# Patient Record
Sex: FEMALE
[De-identification: names, ages, dates, MRNs, and addresses within clinical notes are randomized per-mention and may not be internally consistent; named-entity substitution may affect disease eponyms.]

---

## 2023-12-12 ENCOUNTER — P2P PATIENT RECORD (OUTPATIENT)
Age: 73
End: 2023-12-12

## 2023-12-14 ENCOUNTER — TELEPHONE ENCOUNTER (OUTPATIENT)
Dept: URBAN - METROPOLITAN AREA CLINIC 64 | Facility: CLINIC | Age: 73
End: 2023-12-14

## 2023-12-14 ENCOUNTER — TELEPHONE ENCOUNTER (OUTPATIENT)
Dept: URBAN - METROPOLITAN AREA CLINIC 63 | Facility: CLINIC | Age: 73
End: 2023-12-14

## 2024-03-29 ENCOUNTER — OV NP (OUTPATIENT)
Dept: URBAN - METROPOLITAN AREA CLINIC 63 | Facility: CLINIC | Age: 74
End: 2024-03-29

## 2024-04-18 ENCOUNTER — OV NP (OUTPATIENT)
Dept: URBAN - METROPOLITAN AREA CLINIC 63 | Facility: CLINIC | Age: 74
End: 2024-04-18
Payer: MEDICARE

## 2024-04-18 VITALS
OXYGEN SATURATION: 98 % | SYSTOLIC BLOOD PRESSURE: 140 MMHG | BODY MASS INDEX: 26.12 KG/M2 | TEMPERATURE: 97.5 F | WEIGHT: 153 LBS | HEART RATE: 77 BPM | HEIGHT: 64 IN | DIASTOLIC BLOOD PRESSURE: 82 MMHG

## 2024-04-18 DIAGNOSIS — A04.8 HELICOBACTER PYLORI INFECTION: ICD-10-CM

## 2024-04-18 DIAGNOSIS — Z12.11 SCREEN FOR COLON CANCER: ICD-10-CM

## 2024-04-18 PROBLEM — 305058001: Status: ACTIVE | Noted: 2024-04-18

## 2024-04-18 PROBLEM — 721730009: Status: ACTIVE | Noted: 2024-04-18

## 2024-04-18 PROCEDURE — 99203 OFFICE O/P NEW LOW 30 MIN: CPT | Performed by: INTERNAL MEDICINE

## 2024-04-18 RX ORDER — DORZOLAMIDE HYDROCHLORIDE 20 MG/ML
INSTILL 1 DROP IN BOTH EYES FOUR TIMES DAILY SOLUTION/ DROPS OPHTHALMIC
Qty: 30 MILLILITER | Refills: 0 | Status: ACTIVE | COMMUNITY

## 2024-04-18 RX ORDER — LATANOPROST 50 UG/ML
SOLUTION OPHTHALMIC
Qty: 7.5 MILLILITER | Status: ACTIVE | COMMUNITY

## 2024-04-18 RX ORDER — VALACYCLOVIR 500 MG/1
TAKE 1 TABLET BY MOUTH TWICE DAILY FOR 5 DAYS TABLET, FILM COATED ORAL
Qty: 30 EACH | Refills: 0 | Status: ACTIVE | COMMUNITY

## 2024-04-18 RX ORDER — ATORVASTATIN CALCIUM 10 MG/1
TAKE 1 TABLET BY MOUTH EVERY DAY TABLET, FILM COATED ORAL
Qty: 90 EACH | Refills: 0 | Status: ACTIVE | COMMUNITY

## 2024-04-18 RX ORDER — ONDANSETRON HYDROCHLORIDE 4 MG/1
1 TABLET TABLET, FILM COATED ORAL
Qty: 2 | Refills: 0 | OUTPATIENT
Start: 2024-04-18

## 2024-04-18 RX ORDER — HYDROCORTISONE 25 MG/G
CREAM TOPICAL
Qty: 30 GRAM | Status: ACTIVE | COMMUNITY

## 2024-04-18 RX ORDER — POLYETHYLENE GLYCOL 3350, SODIUM CHLORIDE, SODIUM BICARBONATE, POTASSIUM CHLORIDE 420; 11.2; 5.72; 1.48 G/4L; G/4L; G/4L; G/4L
AS DIRECTED POWDER, FOR SOLUTION ORAL ONCE
Qty: 4000 | Refills: 0 | OUTPATIENT
Start: 2024-04-18 | End: 2024-04-19

## 2024-04-18 NOTE — HPI-PREVIOUS PROCEDURES
colnoscopy [place]- 2014 - no polyps  egd at [facility] in 2018- h pylori - s/p treatemtment with triple regimen- Dr Ly  colonoscopy 2007- [place]

## 2024-04-18 NOTE — HPI-TODAY'S VISIT:
Mrs. 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  She denies any symptoms of reflux or dysphagia early satiety bloating or abdominal pain.  Denies any constipation diarrhea rectal bleeding or melena.  Denies any unintentional weight loss loss of appetite.Her last colonoscopy was done in [place] in 2014 and prior to that in 2007.  Apparently no polyps were found.She had an endoscopy done by Dr. Hyde at [facility] in 2018 for early satiety and bloating and was diagnosed with H. pylori and treated with a amoxicillin based triple regimen.  Unclear if she had confirmation of eradication of H. pylori.
no

## 2024-04-24 ENCOUNTER — COLON (OUTPATIENT)
Dept: URBAN - METROPOLITAN AREA SURGERY CENTER 4 | Facility: SURGERY CENTER | Age: 74
End: 2024-04-24
Payer: MEDICARE

## 2024-04-24 DIAGNOSIS — K64.1 SECOND DEGREE HEMORRHOIDS: ICD-10-CM

## 2024-04-24 DIAGNOSIS — Z12.11 ENCOUNTER FOR SCREENING FOR MALIGNANT NEOPLASM OF COLON: ICD-10-CM

## 2024-04-24 DIAGNOSIS — K57.30 DIVERTICULOSIS OF LARGE INTESTINE WITHOUT PERFORATION OR ABSCESS WITHOUT BLEEDING: ICD-10-CM

## 2024-04-24 DIAGNOSIS — K63.5 POLYP OF DESCENDING COLON, UNSPECIFIED TYPE: ICD-10-CM

## 2024-04-24 PROCEDURE — 45380 COLONOSCOPY AND BIOPSY: CPT | Performed by: INTERNAL MEDICINE

## 2024-04-24 RX ORDER — ONDANSETRON HYDROCHLORIDE 4 MG/1
1 TABLET TABLET, FILM COATED ORAL
Qty: 2 | Refills: 0 | Status: ACTIVE | COMMUNITY
Start: 2024-04-18

## 2024-04-24 RX ORDER — VALACYCLOVIR 500 MG/1
TAKE 1 TABLET BY MOUTH TWICE DAILY FOR 5 DAYS TABLET, FILM COATED ORAL
Qty: 30 EACH | Refills: 0 | Status: ACTIVE | COMMUNITY

## 2024-04-24 RX ORDER — ATORVASTATIN CALCIUM 10 MG/1
TAKE 1 TABLET BY MOUTH EVERY DAY TABLET, FILM COATED ORAL
Qty: 90 EACH | Refills: 0 | Status: ACTIVE | COMMUNITY

## 2024-04-24 RX ORDER — DORZOLAMIDE HYDROCHLORIDE 20 MG/ML
INSTILL 1 DROP IN BOTH EYES FOUR TIMES DAILY SOLUTION/ DROPS OPHTHALMIC
Qty: 30 MILLILITER | Refills: 0 | Status: ACTIVE | COMMUNITY

## 2024-04-24 RX ORDER — LATANOPROST 50 UG/ML
SOLUTION OPHTHALMIC
Qty: 7.5 MILLILITER | Status: ACTIVE | COMMUNITY

## 2024-04-24 RX ORDER — HYDROCORTISONE 25 MG/G
CREAM TOPICAL
Qty: 30 GRAM | Status: ACTIVE | COMMUNITY

## 2024-04-25 ENCOUNTER — LAB (OUTPATIENT)
Dept: URBAN - METROPOLITAN AREA CLINIC 63 | Facility: CLINIC | Age: 74
End: 2024-04-25

## 2024-05-24 ENCOUNTER — DASHBOARD ENCOUNTERS (OUTPATIENT)
Age: 74
End: 2024-05-24

## 2024-05-30 ENCOUNTER — OFFICE VISIT (OUTPATIENT)
Dept: URBAN - METROPOLITAN AREA CLINIC 63 | Facility: CLINIC | Age: 74
End: 2024-05-30

## 2024-05-30 RX ORDER — VALACYCLOVIR 500 MG/1
TAKE 1 TABLET BY MOUTH TWICE DAILY FOR 5 DAYS TABLET, FILM COATED ORAL
Qty: 30 EACH | Refills: 0 | COMMUNITY

## 2024-05-30 RX ORDER — HYDROCORTISONE 25 MG/G
CREAM TOPICAL
Qty: 30 GRAM | COMMUNITY

## 2024-05-30 RX ORDER — ATORVASTATIN CALCIUM 10 MG/1
TAKE 1 TABLET BY MOUTH EVERY DAY TABLET, FILM COATED ORAL
Qty: 90 EACH | Refills: 0 | COMMUNITY

## 2024-05-30 RX ORDER — ONDANSETRON HYDROCHLORIDE 4 MG/1
1 TABLET TABLET, FILM COATED ORAL
Qty: 2 | Refills: 0 | COMMUNITY
Start: 2024-04-18

## 2024-05-30 RX ORDER — DORZOLAMIDE HYDROCHLORIDE 20 MG/ML
INSTILL 1 DROP IN BOTH EYES FOUR TIMES DAILY SOLUTION/ DROPS OPHTHALMIC
Qty: 30 MILLILITER | Refills: 0 | COMMUNITY

## 2024-05-30 RX ORDER — LATANOPROST 50 UG/ML
SOLUTION OPHTHALMIC
Qty: 7.5 MILLILITER | COMMUNITY